# Patient Record
(demographics unavailable — no encounter records)

---

## 2025-07-01 NOTE — HISTORY OF PRESENT ILLNESS
[de-identified] : CC: This is a very pleasant 41-year-old male that presents to the office today as a new patient with right toe pain.  On 6/26/2025 patient was playing with his son and while running kicked a hard object accidentally on the outside of his right foot.  Felt immediate pain and noticed swelling and bruising.  Went to the urgent care on 6/27/2025 where he had x-rays which showed a closed nondisplaced fracture of the proximal phalanx of the lesser toe of the right foot.  He was luis taped and advised to follow-up.  He has a history of broken toes and has a postop shoe.  He states he is able to ambulate without significant pain and notices improvement over the last couple days.  He is here today for follow-up.  Referring Provider: [facility], urgent care

## 2025-07-01 NOTE — DISCUSSION/SUMMARY
Ubaldo missed 3 days of spironolactone.  Now notes that his heart rate is 120-140's at rest.  He denies dizziness/lightheadedness.  No swelling.  Weight is stable as far as he can tell- he has been purposely losing weight over the last few months.  Normal heart rate is 80's.  Feels that his heart rate is regular, not palpitations.     [de-identified] : 41-year-old male presents today for follow-up of closed nondisplaced fracture of the proximal phalanx of the lesser toe of the right foot.  Initially injured this on 6/26/2025 and was confirmed in graphically on 6/27/2025.  Notes improvement in pain, swelling and has had not having any significant difficulty with ambulation at this time.  He was luis taped in the urgent care and has since been utilizing a sandal as this has been more comfortable.  At this time I advised him to transition into a postoperative shoe which he has at today's appointment from prior fractures of the foot.  We discussed timeline, healing of this fracture.  I advised patient to follow-up in 3-4 weeks for repeat x-rays.  In the meantime he may utilize a postoperative shoe while ambulating.  Advised against any significant activity pertaining to the right foot at this time.  He may utilize NSAIDs as anti-inflammatories.  All questions were answered to satisfaction.  Patient understands agrees to current plan.  This office visit included some or all of the following of both face-to-face time (preparation for visit-reviewing prior notes, performing H&P, ordering of medications, tests/ performing procedures, and counseling/education to the patient/family) and non-face-to-face time (deciding on recommendations to patients, independently interpreting tests, documentation in the EMR, communicating with other providers before or during the visit).    I have spent a total time of 45 minutes on this patient encounter on the same day of 7/1/2025.

## 2025-07-01 NOTE — PHYSICAL EXAM
[de-identified] : RIGHT FOOT/ANKLE  General: AAO X 3. In no acute distress. Pleasant in nature with a normal affect. No respiratory distress.  INSPECTION 1. Appearance: Mild swelling/ecchymosis along the lateral aspect of the right fifth toe 2. Arch: Normal 3. Tendon defect: None 4. Hallux Valgus deformity: Not present 5. Valgus hindfoot deformity: Not present  TENDERNESS 1. Lateral Malleolus: negative 2. Medial Malleolus: None 3. Tibiotalar Joint: Negative 4. Proximal Fibular Pain: None 5. Subtalar Joint (inversion + eversion) : Negative 6. Heel Pain: None 7. Cuboid: Negative 8. Navicular: Negative 9. Base of 5th; negative 10. Metatarsals: Negative 11. IP Joints: Negative 12.  Fifth toe proximal phalanx: Positive tenderness  Tendon Pain 1. Achilles: Negative 2. Peroneals: Negative 3. Posterior Tibialis: Negative 4. Tibialis Anterior: Negative 5. FHL: Negative  Ligament Pain 1. ATFL: Negative 2. CFL: Negative 3. PTFL: Negative 4. Deltoid Ligaments: Negative 5. Lisfranc Ligament: Negative 6. Plantar Fascia: Negative  ROM 1. Dorsiflexion: Full-20 degrees 2. Plantarflexion: Full-45 degrees 3. Eversion: Full-20 degrees 4. Inversion: Full-20 degrees 5. Toe flexion: normal 6. Toe extension: normal  STRENGTH 1. Ankle Plantarflexion: 5/5 2. Ankle Dorsiflexion: 5/5 3. Ankle Inversion: 5/5 4. Ankle Eversion: 5/5  SENSORY/VASCULAR 1. Light touch: Intact over the superficial and deep peroneal nerve distributions and the posterior tibial nerve distribution 2. Capillary refill: Less than two seconds 3. Pulses: PT and DP pulses 2+ equal bilaterally 4. Calf: No calf swelling or tenderness bilaterally 5. Compartments: Soft and compressible  SPECIAL TESTING 1. Tib/Fib Squeeze: Negative at proximal and distal syndesmosis 2. Calcaneal Squeeze: [negative 3. Juárez Test: Negative 4. Ankle anterior drawer: Negative for pain and laxity 5. Talar tilt (CFL): Normal 6. Subtalar inversion: Normal 7. Subtalar eversion: Normal 8. Kleiger's test (DF + ER): Normal 9. Bump test: Negative at talar dome, syndesmosis 10. Shiela's Click: Negative 11. Tinel's (at Tarsal Tunnel): Negative 12. Pronation-Abduction test: Negative  LEFT FOOT/ANKLE Inspection: No evidence of swelling, erythema, plantar/dorsal ecchymosis Tenderness: Default Nontender: Medial and lateral malleoli, navicular, tibiotalar joint, proximal fibula, subtalar joint, heel pain, cuboid, navicular, base of fifth, metatarsals, IP joints Tendonous testing: Negative pain Achilles, peroneals, posterior tibialis, tibialis anterior, FHL Ligamentous Testing: Negative anterior drawer, negative posterior drawer, negative talar tilt, negative external rotation/Kleiger's test Range of Motion: Full in all planes Strength Testin/5 painless resisted inversion/eversion, dorsiflexion/plantarflexion Distal Neurovascular Exam: Dorsalis and tibialis posterior pulses intact, sensation intact throughout entire lower extremity, 2+ patellar and Achilles reflexes, intact motor strength throughout lower extremity [de-identified] : XR of Date: 6/27/2025 Indication: [Right Foot Pain] Views: [3-weight-bearing/non weightbearing, AP, Oblique, Lateral]  Performed at [facility]: [Yes]  Impression:  3 views of the right foot were obtained today and show acute obliquely oriented nondisplaced fracture across the proximal to mid aspect of the proximal phalanx of the fifth toe

## 2025-07-15 NOTE — HISTORY OF PRESENT ILLNESS
[de-identified] : 7/15/2025: Patient presents today for follow-up of closed nondisplaced fracture of the proximal phalanx of the fifth digit of the right foot.  Since last visit, notices mild to moderate improvement in pain. Increasing activity. No running as of yet. Here today for further evaluation  7/1/2025: This is a very pleasant 41-year-old male that presents to the office today as a new patient with right toe pain.  On 6/26/2025 patient was playing with his son and while running kicked a hard object accidentally on the outside of his right foot.  Felt immediate pain and noticed swelling and bruising.  Went to the urgent care on 6/27/2025 where he had x-rays which showed a closed nondisplaced fracture of the proximal phalanx of the lesser toe of the right foot.  He was luis taped and advised to follow-up.  He has a history of broken toes and has a postop shoe.  He states he is able to ambulate without significant pain and notices improvement over the last couple days.  He is here today for follow-up.  Referring Provider: [facility], urgent care

## 2025-07-15 NOTE — DISCUSSION/SUMMARY
[de-identified] : 42-year-old male presents today for follow-up of closed nondisplaced fracture of the proximal phalanx of the lesser toe of the right foot.  Initially injured this on 6/26/2025 and was confirmed radiographically on 6/27/2025.  Notes improvement in pain, swelling and has had not having any significant difficulty with ambulation at this time. We discussed timeline, healing of this fracture.  Repeat x-rays today showed slow healing. May utlize post-op shoe/luis tape for comfort. May start running as pain allows/2 weeks. Follow-up if no continued improvement. All questions were answered to satisfaction.  Patient understands agrees to current plan.

## 2025-07-15 NOTE — PHYSICAL EXAM
[de-identified] : RIGHT FOOT/ANKLE  General: AAO X 3. In no acute distress. Pleasant in nature with a normal affect. No respiratory distress.  INSPECTION 1. Appearance: Resolved swelling/ecchymosis along the lateral aspect of the right fifth toe 2. Arch: Normal 3. Tendon defect: None 4. Hallux Valgus deformity: Not present 5. Valgus hindfoot deformity: Not present  TENDERNESS 1. Lateral Malleolus: negative 2. Medial Malleolus: None 3. Tibiotalar Joint: Negative 4. Proximal Fibular Pain: None 5. Subtalar Joint (inversion + eversion) : Negative 6. Heel Pain: None 7. Cuboid: Negative 8. Navicular: Negative 9. Base of 5th; negative 10. Metatarsals: Negative 11. IP Joints: Negative 12.  Fifth toe proximal phalanx: Positive tenderness  Tendon Pain 1. Achilles: Negative 2. Peroneals: Negative 3. Posterior Tibialis: Negative 4. Tibialis Anterior: Negative 5. FHL: Negative  Ligament Pain 1. ATFL: Negative 2. CFL: Negative 3. PTFL: Negative 4. Deltoid Ligaments: Negative 5. Lisfranc Ligament: Negative 6. Plantar Fascia: Negative  ROM 1. Dorsiflexion: Full-20 degrees 2. Plantarflexion: Full-45 degrees 3. Eversion: Full-20 degrees 4. Inversion: Full-20 degrees 5. Toe flexion: normal 6. Toe extension: normal  STRENGTH 1. Ankle Plantarflexion: 5/5 2. Ankle Dorsiflexion: 5/5 3. Ankle Inversion: 5/5 4. Ankle Eversion: 5/5  SENSORY/VASCULAR 1. Light touch: Intact over the superficial and deep peroneal nerve distributions and the posterior tibial nerve distribution 2. Capillary refill: Less than two seconds 3. Pulses: PT and DP pulses 2+ equal bilaterally 4. Calf: No calf swelling or tenderness bilaterally 5. Compartments: Soft and compressible  SPECIAL TESTING 1. Tib/Fib Squeeze: Negative at proximal and distal syndesmosis 2. Calcaneal Squeeze: [negative 3. Juárez Test: Negative 4. Ankle anterior drawer: Negative for pain and laxity 5. Talar tilt (CFL): Normal 6. Subtalar inversion: Normal 7. Subtalar eversion: Normal 8. Kleiger's test (DF + ER): Normal 9. Bump test: Negative at talar dome, syndesmosis 10. Shiela's Click: Negative 11. Tinel's (at Tarsal Tunnel): Negative 12. Pronation-Abduction test: Negative  LEFT FOOT/ANKLE Inspection: No evidence of swelling, erythema, plantar/dorsal ecchymosis Tenderness: Default Nontender: Medial and lateral malleoli, navicular, tibiotalar joint, proximal fibula, subtalar joint, heel pain, cuboid, navicular, base of fifth, metatarsals, IP joints Tendonous testing: Negative pain Achilles, peroneals, posterior tibialis, tibialis anterior, FHL Ligamentous Testing: Negative anterior drawer, negative posterior drawer, negative talar tilt, negative external rotation/Kleiger's test Range of Motion: Full in all planes Strength Testin/5 painless resisted inversion/eversion, dorsiflexion/plantarflexion Distal Neurovascular Exam: Dorsalis and tibialis posterior pulses intact, sensation intact throughout entire lower extremity, 2+ patellar and Achilles reflexes, intact motor strength throughout lower extremity [de-identified] : XR of Date: 7/15/2025 Indication: Nondisplaced fracture across the proximal mid aspect of the proximal phalanx of the fifth toe Views: 3-weight-bearing/non weightbearing, AP, Oblique, Lateral Performed at [facility]: Yes  Impression:  3 views of the right foot were obtained today and show healing of the nondisplaced fracture across the proximal mid aspect of the proximal phalanx of the fifth toe  The radiographs discussed were ordered and read by me during this patient's visit.  I reviewed each radiograph detail with the patient discussed the findings highlighted in the Impression.  XR of Date: 6/27/2025 Indication: Right Foot Pain Views: 3-weight-bearing/non weightbearing, AP, Oblique, Lateral Performed at [facility]: Yes  Impression:  3 views of the right foot were obtained today and show acute obliquely oriented nondisplaced fracture across the proximal to mid aspect of the proximal phalanx of the fifth toe